# Patient Record
Sex: FEMALE | Race: BLACK OR AFRICAN AMERICAN | NOT HISPANIC OR LATINO
[De-identification: names, ages, dates, MRNs, and addresses within clinical notes are randomized per-mention and may not be internally consistent; named-entity substitution may affect disease eponyms.]

---

## 2017-03-11 ENCOUNTER — TRANSCRIPTION ENCOUNTER (OUTPATIENT)
Age: 62
End: 2017-03-11

## 2017-08-31 ENCOUNTER — EMERGENCY (EMERGENCY)
Facility: HOSPITAL | Age: 62
LOS: 1 days | Discharge: HOME | End: 2017-08-31
Attending: INTERNAL MEDICINE | Admitting: INTERNAL MEDICINE
Payer: MEDICARE

## 2017-08-31 VITALS
SYSTOLIC BLOOD PRESSURE: 114 MMHG | RESPIRATION RATE: 18 BRPM | TEMPERATURE: 98 F | WEIGHT: 207.9 LBS | OXYGEN SATURATION: 99 % | HEART RATE: 89 BPM | HEIGHT: 59 IN | DIASTOLIC BLOOD PRESSURE: 72 MMHG

## 2017-08-31 PROCEDURE — 70450 CT HEAD/BRAIN W/O DYE: CPT

## 2017-08-31 PROCEDURE — 99284 EMERGENCY DEPT VISIT MOD MDM: CPT

## 2017-08-31 PROCEDURE — 70450 CT HEAD/BRAIN W/O DYE: CPT | Mod: 26

## 2017-08-31 PROCEDURE — 99284 EMERGENCY DEPT VISIT MOD MDM: CPT | Mod: 25

## 2017-08-31 RX ORDER — ACETAMINOPHEN 500 MG
650 TABLET ORAL ONCE
Qty: 0 | Refills: 0 | Status: COMPLETED | OUTPATIENT
Start: 2017-08-31 | End: 2017-08-31

## 2017-08-31 RX ADMIN — Medication 650 MILLIGRAM(S): at 12:18
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
## 2017-08-31 NOTE — ED ADULT NURSE REASSESSMENT NOTE - NS ED NURSE REASSESS COMMENT FT1
spoke with sister brandan chandler consent for evaluation and treatment and may be d'c/d home with staff 9

## 2017-08-31 NOTE — ED PROVIDER NOTE - PMH
Asthma    Developmental disability    Diabetes    Essential tremor    GERD (gastroesophageal reflux disease)    Hyperlipidemia    Hypertension    Hypothyroid    Mental retardation    Obesity    Seizure    Ulcer disease

## 2017-08-31 NOTE — ED PROVIDER NOTE - NS_ ATTENDINGSCRIBEDETAILS _ED_A_ED_FT
Rashi Light MD - The scribe's documentation has been prepared under my direction and personally reviewed by me in its entirety. I confirm that the note above accurately reflects all work, treatment, procedures, and medical decision making performed by me.
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
## 2019-01-24 ENCOUNTER — TRANSCRIPTION ENCOUNTER (OUTPATIENT)
Age: 64
End: 2019-01-24

## 2019-03-16 NOTE — ED ADULT NURSE NOTE - CHIEF COMPLAINT
Cm received voicemail from Ansley Aden from [facility] who stated transport Janessa Sawyer was received for 1 trip non-emergent BLS #[id number] with Naveen Leonard  Patient's transport has been arranged with [facility] and Cm made insurance aware of this when CMN was faxed to determine auth coverage  Cm left voicemail for [facility] [phone] yesterday and no return call received  Cm left a 2nd voicemail today [phone] and awaiting return call  Cm called [facility] to determine if able to provide bariatric BLS transport  Awaiting return call as answering service forwarded information to dispatch team     Voicemail also left for Carlotta Olvera [phone] with [facility] to discuss BLS auth  Awaiting return call  Cm called customer service number for [employer] and insurance closed over weekend  Awaiting call back from [facility] who is confirming with billing department if able to take patient with auth although for different transport company with plan for Cm to follow-up with insurance on Monday 3/18/19  Cm confirmed with Serafin Keys and Shiela Rosenbaum from Billing with [facility] they are able to transport patient and will follow-up Monday for change in auth information  Cm faxed facesheet with insurance information and auth # for BLS transport auth to [facility] per [facility] request [phone]  Cm following  Cm confirmed discharge with both Steve Cooley and [place] via [employer] and left voicemail for [facility] as well  The patient is a 62y Female complaining of anxiety.

## 2019-12-10 ENCOUNTER — TRANSCRIPTION ENCOUNTER (OUTPATIENT)
Age: 64
End: 2019-12-10

## 2019-12-11 ENCOUNTER — RESULT REVIEW (OUTPATIENT)
Age: 64
End: 2019-12-11

## 2019-12-11 ENCOUNTER — OUTPATIENT (OUTPATIENT)
Dept: OUTPATIENT SERVICES | Facility: HOSPITAL | Age: 64
LOS: 1 days | End: 2019-12-11
Payer: MEDICARE

## 2019-12-11 DIAGNOSIS — Z12.89 ENCOUNTER FOR SCREENING FOR MALIGNANT NEOPLASM OF OTHER SITES: ICD-10-CM

## 2019-12-11 DIAGNOSIS — D64.9 ANEMIA, UNSPECIFIED: ICD-10-CM

## 2019-12-11 PROCEDURE — 45380 COLONOSCOPY AND BIOPSY: CPT | Mod: PT

## 2019-12-11 PROCEDURE — 88305 TISSUE EXAM BY PATHOLOGIST: CPT

## 2019-12-11 PROCEDURE — 88305 TISSUE EXAM BY PATHOLOGIST: CPT | Mod: 26

## 2019-12-12 LAB — SURGICAL PATHOLOGY STUDY: SIGNIFICANT CHANGE UP

## 2020-04-09 ENCOUNTER — EMERGENCY (EMERGENCY)
Facility: HOSPITAL | Age: 65
LOS: 1 days | Discharge: HOME | End: 2020-04-09
Attending: EMERGENCY MEDICINE | Admitting: EMERGENCY MEDICINE
Payer: MEDICARE

## 2020-04-09 VITALS
WEIGHT: 210.1 LBS | DIASTOLIC BLOOD PRESSURE: 87 MMHG | RESPIRATION RATE: 18 BRPM | OXYGEN SATURATION: 97 % | TEMPERATURE: 98 F | SYSTOLIC BLOOD PRESSURE: 122 MMHG | HEART RATE: 95 BPM

## 2020-04-09 VITALS
HEART RATE: 76 BPM | SYSTOLIC BLOOD PRESSURE: 128 MMHG | OXYGEN SATURATION: 97 % | RESPIRATION RATE: 16 BRPM | TEMPERATURE: 98 F | DIASTOLIC BLOOD PRESSURE: 75 MMHG

## 2020-04-09 PROCEDURE — 72125 CT NECK SPINE W/O DYE: CPT

## 2020-04-09 PROCEDURE — 70450 CT HEAD/BRAIN W/O DYE: CPT

## 2020-04-09 PROCEDURE — 12001 RPR S/N/AX/GEN/TRNK 2.5CM/<: CPT

## 2020-04-09 PROCEDURE — 99284 EMERGENCY DEPT VISIT MOD MDM: CPT

## 2020-04-09 PROCEDURE — 70450 CT HEAD/BRAIN W/O DYE: CPT | Mod: 26

## 2020-04-09 PROCEDURE — 99284 EMERGENCY DEPT VISIT MOD MDM: CPT | Mod: 25

## 2020-04-09 PROCEDURE — 72125 CT NECK SPINE W/O DYE: CPT | Mod: 26

## 2020-04-09 PROCEDURE — 12011 RPR F/E/E/N/L/M 2.5 CM/<: CPT

## 2020-04-09 NOTE — ED ADULT NURSE NOTE - NSIMPLEMENTINTERV_GEN_ALL_ED
Implemented All Fall Risk Interventions:  [place] to call system. Call bell, personal items and telephone within reach. Instruct patient to call for assistance. Room bathroom lighting operational. Non-slip footwear when patient is off stretcher. Physically safe environment: no spills, clutter or unnecessary equipment. Stretcher in lowest position, wheels locked, appropriate side rails in place. Provide visual cue, wrist band, yellow gown, etc. Monitor gait and stability. Monitor for mental status changes and reorient to person, place, and time. Review medications for side effects contributing to fall risk. Reinforce activity limits and safety measures with patient and family.

## 2020-04-09 NOTE — ED ADULT NURSE NOTE - CHPI ED NUR SYMPTOMS NEG
no blurred vision/no change in level of consciousness/no chest pain/no loss of consciousness/no seizure/no back pain/no vomiting/no chest wall tenderness/no weakness
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
## 2020-04-09 NOTE — ED PROVIDER NOTE - PATIENT PORTAL LINK FT
You can access the FollowMyHealth Patient Portal offered by [facility] by registering at the following website: [email or web address]. By joining [employer]’s FollowMyHealth portal, you will also be able to view your health information using other applications (apps) compatible with our system.
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
## 2020-04-09 NOTE — ED PROVIDER NOTE - NSFOLLOWUPINSTRUCTIONS_ED_ALL_ED_FT
Wash daily gently with warm soap and water, pat dry and apply bacitracin.  STAPLE REMOVAL in 7 days.  Return to ED immediately for new or concerning symptoms.
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
## 2020-04-21 ENCOUNTER — TRANSCRIPTION ENCOUNTER (OUTPATIENT)
Age: 65
End: 2020-04-21

## 2020-09-24 ENCOUNTER — EMERGENCY (EMERGENCY)
Facility: HOSPITAL | Age: 65
LOS: 1 days | Discharge: HOME | End: 2020-09-24
Attending: STUDENT IN AN ORGANIZED HEALTH CARE EDUCATION/TRAINING PROGRAM | Admitting: EMERGENCY MEDICINE
Payer: MEDICARE

## 2020-09-24 VITALS
HEART RATE: 60 BPM | OXYGEN SATURATION: 98 % | TEMPERATURE: 98 F | SYSTOLIC BLOOD PRESSURE: 114 MMHG | DIASTOLIC BLOOD PRESSURE: 88 MMHG | RESPIRATION RATE: 16 BRPM

## 2020-09-24 VITALS
HEIGHT: 59 IN | DIASTOLIC BLOOD PRESSURE: 64 MMHG | SYSTOLIC BLOOD PRESSURE: 120 MMHG | OXYGEN SATURATION: 98 % | RESPIRATION RATE: 16 BRPM | HEART RATE: 88 BPM | TEMPERATURE: 98 F

## 2020-09-24 PROCEDURE — 99283 EMERGENCY DEPT VISIT LOW MDM: CPT | Mod: 25

## 2020-09-24 PROCEDURE — 73502 X-RAY EXAM HIP UNI 2-3 VIEWS: CPT

## 2020-09-24 PROCEDURE — 73502 X-RAY EXAM HIP UNI 2-3 VIEWS: CPT | Mod: 26,RT

## 2020-09-24 PROCEDURE — 99283 EMERGENCY DEPT VISIT LOW MDM: CPT

## 2020-09-24 RX ORDER — LIDOCAINE 4 G/100G
1 CREAM TOPICAL ONCE
Refills: 0 | Status: COMPLETED | OUTPATIENT
Start: 2020-09-24 | End: 2020-09-24

## 2020-09-24 RX ORDER — IBUPROFEN 200 MG
400 TABLET ORAL ONCE
Refills: 0 | Status: COMPLETED | OUTPATIENT
Start: 2020-09-24 | End: 2020-09-24

## 2020-09-24 RX ADMIN — Medication 400 MILLIGRAM(S): at 21:41

## 2020-09-24 RX ADMIN — LIDOCAINE 1 PATCH: 4 CREAM TOPICAL at 20:41

## 2020-09-24 RX ADMIN — Medication 400 MILLIGRAM(S): at 20:41

## 2020-09-24 NOTE — ED PROVIDER NOTE - ATTENDING CONTRIBUTION TO CARE
64 yo F PMHx asthma, developmental disability, MR, DM, essential tremor, GERD, HTN, HLD, hypothyroidism, seizures pw 1 week of right gluteal pain. no injury, + ambulatory, no swelling or redness  on exam from, no deformity  likely sciatica vs muscle strain, will give pain medications, xr ortho follow up

## 2020-09-24 NOTE — ED ADULT NURSE NOTE - NSIMPLEMENTINTERV_GEN_ALL_ED
Implemented All Fall with Harm Risk Interventions:  [place] to call system. Call bell, personal items and telephone within reach. Instruct patient to call for assistance. Room bathroom lighting operational. Non-slip footwear when patient is off stretcher. Physically safe environment: no spills, clutter or unnecessary equipment. Stretcher in lowest position, wheels locked, appropriate side rails in place. Provide visual cue, wrist band, yellow gown, etc. Monitor gait and stability. Monitor for mental status changes and reorient to person, place, and time. Review medications for side effects contributing to fall risk. Reinforce activity limits and safety measures with patient and family. Provide visual clues: red socks.

## 2020-09-24 NOTE — ED ADULT NURSE NOTE - OBJECTIVE STATEMENT
Patient brought in to ED accompanied by caregiver c/o right hip pain x 1 week. Patient denies trauma / fall.

## 2020-09-24 NOTE — ED PROVIDER NOTE - CARE PROVIDER_API CALL
Talon Gu (DO)  Orthopaedic Surgery  [address] [place] [address]  Phone: [phone]  Fax: [phone]  Follow Up Time: 1-3 Days

## 2020-09-24 NOTE — ED PROVIDER NOTE - CLINICAL SUMMARY MEDICAL DECISION MAKING FREE TEXT BOX
66 y/o F with hx MR, seizures presents with nontraumatic R hip pain x 1 week, no fever, amble to ambulate and has full ROM, taking tylenols at home with minimal relief, no deformity noted, plan= xray pain control and likely will need d/c with f.u ortho
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
## 2020-09-24 NOTE — ED PROVIDER NOTE - NSFOLLOWUPINSTRUCTIONS_ED_ALL_ED_FT
1. TAKE ALL MEDICATIONS AS DIRECTED.  FOR PAIN YOU CAN TAKE IBUPROFEN (MOTRIN, ADVIL) OR ACETAMINOPHEN (TYLENOL) AS NEEDED, AS DIRECTED ON PACKAGING.  2. FOLLOW UP WITH ___ORTHOPEDIST_______ AS DIRECTED.  YOU WERE GIVEN COPIES OF ALL LABS AND IMAGING RESULTS FROM YOUR ER VISIT--PLEASE TAKE THEM WITH YOU TO YOUR APPOINTMENT.  3.  REST, APPLY ICE AS NEEDED.  4. RETURN TO THE ER FOR ANY NEW OR WORSENING SYMPTOMS.

## 2020-09-24 NOTE — ED ADULT TRIAGE NOTE - CHIEF COMPLAINT QUOTE
65 yr old female presents to the ED with c/o atraumatic left hip and leg pain 65 yr old female presents to the ED with c/o atraumatic right hip and leg pain 65 yr old female presents to the ED with c/o atraumatic right hip and leg pain x 1 week.

## 2020-09-24 NOTE — ED PROVIDER NOTE - PATIENT PORTAL LINK FT
You can access the FollowMyHealth Patient Portal offered by [facility] by registering at the following website: [email or web address]. By joining [employer]’s FollowMyHealth portal, you will also be able to view your health information using other applications (apps) compatible with our system.

## 2020-11-07 ENCOUNTER — TRANSCRIPTION ENCOUNTER (OUTPATIENT)
Age: 65
End: 2020-11-07

## 2022-03-09 NOTE — ED PROVIDER NOTE - TOBACCO USE
RX PROGRESS NOTE: Vancomycin Therapeutic Drug Monitoring      Indication for therapy: Bacteremia    ALLERGIES:  No Known Allergies    Most recent height and weight information:  Weight: 52 kg (03/09/22 0205)  Height: 5' 9\" (175.3 cm) (03/09/22 0205)    The Following are the calculated  Current Weights for Mariah Marquez     Adjusted Ideal    52 kg 66.2 kg             Labs:  Serum Creatinine and Creatinine Clearance:  Serum creatinine: 0.43 mg/dL (L) 03/08/22 2006  Estimated creatinine clearance: 120 mL/min (A)    Maximum Temperature (last 24 hours)     Value Max    Temp 98.1 °F (36.7 °C)        WBC (K/mcL)   Date/Time Value   03/08/2022 2006 14.4 (H)     Microbiology Results  (Last 10 results in the past 7 days)    Specimen   Gram Smear   Culture Result   Status       03/09/22  0120         No epithelial cells seen.  [P]            No organisms seen.  [P]            Present Polymorphonuclear cells.  [P]            Slide prepared by [employer].  [P]                 [P] - Preliminary Result               Assessment/Plan:  Briefly, this is a 43 year old female started on vancomycin for Bacteremia, with a target serum trough concentration of 10-15 mcg/mL.  Patient received a dose of vancomycin 1000 mg at 0200 in ED.  Initial dosing regimen should have been 1250 mg loading dose, will increase first maintenance dose to 1500mg x1 at 1400 to complete load, followed by a maintenance dose of 1250 mg every 12 hours.    Pharmacy will monitor levels as appropriate.    Pharmacy will continue to monitor patient (renal function, microbiology data, risk factors for adverse events, appropriate duration of therapy), will order/monitor serum levels as appropriate, and will adjust dose if/when necessary.      Thank you,    Bhavana Patel [facility]  3/9/2022 3:57 AM     Unknown if ever smoked

## 2022-05-13 NOTE — ED PROVIDER NOTE - NS ED MD EM SELECTION
Bertha pad 50% saturated. New linens and bertha pad placed. will continue to monitor. Patient denies pain.   [id number] Detailed

## 2023-03-08 NOTE — ED ADULT NURSE NOTE - NURSING MUSC ROM
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
full range of motion in all extremities

## 2023-05-11 NOTE — ED PROVIDER NOTE - NEUROLOGICAL, MLM
Addended by: NOVA BERNSTEIN on: 5/10/2023 07:11 PM     Modules accepted: Orders    
Addended by: NOVA BERNSTEIN on: 5/11/2023 12:46 PM     Modules accepted: Orders    
Addended by: NOVA BERNSTEIN on: 5/11/2023 12:48 PM     Modules accepted: Orders    
Alert and oriented, no focal deficits, no motor or sensory deficits.